# Patient Record
Sex: MALE | Race: ASIAN | NOT HISPANIC OR LATINO | URBAN - METROPOLITAN AREA
[De-identification: names, ages, dates, MRNs, and addresses within clinical notes are randomized per-mention and may not be internally consistent; named-entity substitution may affect disease eponyms.]

---

## 2020-12-10 ENCOUNTER — EMERGENCY (EMERGENCY)
Facility: HOSPITAL | Age: 40
LOS: 1 days | Discharge: HOME | End: 2020-12-10
Attending: STUDENT IN AN ORGANIZED HEALTH CARE EDUCATION/TRAINING PROGRAM | Admitting: STUDENT IN AN ORGANIZED HEALTH CARE EDUCATION/TRAINING PROGRAM
Payer: MEDICAID

## 2020-12-10 VITALS
SYSTOLIC BLOOD PRESSURE: 117 MMHG | DIASTOLIC BLOOD PRESSURE: 70 MMHG | RESPIRATION RATE: 18 BRPM | TEMPERATURE: 98 F | HEART RATE: 56 BPM | OXYGEN SATURATION: 99 %

## 2020-12-10 VITALS
DIASTOLIC BLOOD PRESSURE: 62 MMHG | HEART RATE: 51 BPM | SYSTOLIC BLOOD PRESSURE: 111 MMHG | OXYGEN SATURATION: 99 % | RESPIRATION RATE: 17 BRPM | TEMPERATURE: 98 F

## 2020-12-10 PROCEDURE — 99282 EMERGENCY DEPT VISIT SF MDM: CPT

## 2020-12-10 NOTE — ED ADULT TRIAGE NOTE - CHIEF COMPLAINT QUOTE
Patient has c/o rectal inflammation that started about three weeks ago and has gotten worse although he has tried to treat it.

## 2020-12-10 NOTE — ED PROVIDER NOTE - ATTENDING CONTRIBUTION TO CARE
39 yo M past medical history rectal pain x 6 weeks. patient has been using treatments for  hemorrhoids with improvement of symptoms but has been having intermittent recurrence of the pain. has mild chronic constipation but is able to have BMs. no blood/pus in BMs. denies abd pain, n/v, fever chills. exam as above. chronic rectal pain, suspect hemorrhoid/ chronic constipation related. discussed laxative use as well as symptom relief. rec GI follow up as well. stable for dc. no evidence of infection.

## 2020-12-10 NOTE — ED PROVIDER NOTE - OBJECTIVE STATEMENT
39 yo m no reported pmh pw rectal pain x 6 weeks. pt reports pain w/ stooling. reports weeks ago had mild rectal bleeding, brbpr, which has resolved. states has been using hydrocortisone w/ relief. has also used nsaids, and sitz baths. states this morning pain was severe. denies trauma, f/c, abd pain, hx of procedures.

## 2020-12-10 NOTE — ED PROVIDER NOTE - PATIENT PORTAL LINK FT
You can access the FollowMyHealth Patient Portal offered by [facility] by registering at the following website: [email or web address]. By joining [facility]’s FollowMyHealth portal, you will also be able to view your health information using other applications (apps) compatible with our system.

## 2020-12-10 NOTE — ED PROVIDER NOTE - NS ED ROS FT
GENERAL: No fever or chills, //             EYES: no change in vision, //             HEENT: no trouble swallowing or speaking, //             CARDIAC: no chest pain, //              PULMONARY: no cough or SOB, //             GI: rectal pain //             : No changes in urination,  //            SKIN: no rashes,  //            NEURO: no headache,  //             MSK: No joint pain otherwise as HPI or negative. ~Vaibhav Carey DO PGY3

## 2020-12-10 NOTE — ED PROVIDER NOTE - CLINICAL SUMMARY MEDICAL DECISION MAKING FREE TEXT BOX
katherine [email or web address]: 39 yo m pw rectal pain x 6 weeks, attempted multiple interventions outpatient w/ partial relief. pain worse today. in ED pt well appearing, no abnormal findings on physical exam. pt reports straining with stool. suspect hemorrhoids. will rec stool softeners and have pt eval by GI. dc

## 2020-12-10 NOTE — ED PROVIDER NOTE - NSFOLLOWUPINSTRUCTIONS_ED_ALL_ED_FT
1) Please follow up with your Primary Care Provider in 24-48 hours  2) Seek immediate medical care for any new or returning symptoms including but not limited severe pain, high fevers, pus drainage

## 2020-12-10 NOTE — ED PROVIDER NOTE - PHYSICAL EXAMINATION
General: well appearing, interactive, well nourished, no apparent distress, ncat  HEENT: EOMI, PERRLA, normal mucosa, normal oropharynx, no lesions on the lips or on oral mucosa, normal external ear  Neck: supple, no lymphadenopathy, full range of motion, no nuchal rigidity  CV: RRR, normal S1 and S2 with no murmur, capillary refill less than two seconds  Resp: lungs CTA b/l, good aeration bilaterally, symmetric chest wall   Abd: non-distended, soft, non-tender  rectal: no external hemorrhoids or fissues visualized, no internal hemorrhoids palpated, no stool elicited on digital exam  : no CVA tenderness  MSK: full range of motion, no cyanosis, no edema, no clubbing, no immobility  Neuro: CN II-XII grossly intact, muscle strength 5/5 in all extremities, normal gait  Skin: no rashes, skin intact      chaperone: Dr. Nielsen